# Patient Record
Sex: FEMALE | Race: WHITE | NOT HISPANIC OR LATINO | Employment: OTHER | URBAN - METROPOLITAN AREA
[De-identification: names, ages, dates, MRNs, and addresses within clinical notes are randomized per-mention and may not be internally consistent; named-entity substitution may affect disease eponyms.]

---

## 2019-09-09 ENCOUNTER — HISTORICAL (OUTPATIENT)
Dept: ADMINISTRATIVE | Facility: HOSPITAL | Age: 74
End: 2019-09-09

## 2021-12-21 ENCOUNTER — HISTORICAL (OUTPATIENT)
Dept: ADMINISTRATIVE | Facility: HOSPITAL | Age: 76
End: 2021-12-21

## 2021-12-25 LAB — FINAL CULTURE: NORMAL

## 2022-04-11 ENCOUNTER — HISTORICAL (OUTPATIENT)
Dept: ADMINISTRATIVE | Facility: HOSPITAL | Age: 77
End: 2022-04-11

## 2022-04-27 VITALS
DIASTOLIC BLOOD PRESSURE: 68 MMHG | WEIGHT: 115 LBS | SYSTOLIC BLOOD PRESSURE: 132 MMHG | BODY MASS INDEX: 21.16 KG/M2 | HEIGHT: 62 IN

## 2022-05-04 NOTE — HISTORICAL [facility]
This is a historical note converted from [employer]. Formatting and pictures may have been removed.  Please reference [employer] for original formatting and attached multimedia. Chief Complaint  Pt is here for left hand pain and bilateral heel pain. Pt also c/o right middle finger  locking  History of Present Illness  Patient is here for evaluation of?a mass that has gotten smaller?on the left index finger. ?It was bothering her?but now has?gotten smaller no longer hurts.? She thinks it is a cyst. ?She also has a triggering of her right middle finger and bilateral heel pain.? No history of injury.  Review of Systems  Systemic: No fever, no chills, and no recent weight change.  Head: No headache - frequent.  Eyes: No vision problems.  Otolarnygeal: No hearing loss, no earache, no epistaxis, no hoarseness, and no tooth pain. Gums normal.  Cardiovascular: No chest pain or discomfort and no palpitations.  Pulmonary: No pulmonary symptoms - difficulty sleeping, no dyspnea, and cough not worse in the morning.  Gastrointestinal: Appetite not decreased. No dysphagia and no constant eructation. No nausea, no vomiting, no abdominal pain, no hematochezia, and no loose/mushy stools - frequent. No constipation - frequent.  Genitourinary: No genitourinary symptoms - Getting up every night to urinate and no increase in urinary frequency. No urinary hesitancy. No urinary loss of control - difficulty stopping urination and no burning sensation during urination.  Musculoskeletal: No calf muscle cramps and no localized soft tissue swelling of the ankle.  Neurological: No fainting and no convulsions.  Psychological: Not feeling nervous tension, not feeling nervous from exhaustion, and no depression.  Skin: No rash. Previous history of no ulcers.  Physical Exam  Vitals & Measurements  HR:?78(Peripheral)? BP:?132/68?  HT:?158?cm? WT:?52.16?kg? BMI:?20.89?  Left hand exam:  Normal capillary refill  2+ pulses  Normal sensation  Normal motor  function  Full range of motion  No triggering  Patient has a palpable?likely?ganglion cyst?over the?volar aspect?of the?flexor?tendon?sheath.  Left hand radiographs are normal  ?  Right hand exam:  Normal capillary refill  2+ pulses next a normal sensation  normal motor function  Full range of motion  Triggering of the?right middle finger?at the A1 pulley  ???  ???  Using sterile techniques after informed verbal consent. Risk discussed with patient prior to injection  ?  Bilateral foot exam:  2+ pulses  Normal sensation  Normal motor function  Tight Achilles  Tenderness palpation?at the medial?plantar insertion?of the plantar fascia at the calcaneus  No tenderness over the posterior tibialis tendons  Supple normal range of motion of both?hindfeet/subtalar joints  No crepitus  Radiographs of both heels are normal  Assessment/Plan  1.?Ganglion of flexor tendon sheath of left index finger?M67.442  ?If the mass enlarges or becomes?tender then I would recommend excision of the ganglion?from the left index flexor tendon sheath overlying the proximal phalanx  Ordered:  Office/Outpatient Visit Level 2 New [id number] PC, Ganglion of flexor tendon sheath of left index finger  Trigger finger, right middle finger  Bilateral plantar fasciitis, [facility], 09/09/19 15:09:00 CDT  ?  2.?Trigger finger, right middle finger?M65.331  ?If no improvement in a few weeks?with the corticosteroid injection of the tendon sheath?then patient will call?if she wants to proceed with right middle finger trigger finger release  Ordered:  betamethasone, 6 mg, Intra-Articular, Once, first dose 09/09/19 16:00:00 CDT, stop date 09/09/19 16:00:00 CDT  Lidocaine inj., 1 mL, Intra-Articular, Once, first dose 09/09/19 15:09:00 CDT, stop date 09/09/19 15:09:00 CDT  Office/Outpatient Visit Level 2 New [id number] PC, Ganglion of flexor tendon sheath of left index finger  Trigger finger, right middle finger  Bilateral plantar fasciitis, [facility], 09/09/19 15:09:00 CDT  ?  3.?Bilateral plantar fasciitis?M72.2  ?Home exercise program for stretching  Physical therapy  Ordered:  Office/Outpatient Visit Level 2 New [id number] PC, Ganglion of flexor tendon sheath of left index finger  Trigger finger, right middle finger  Bilateral plantar fasciitis, [facility], 09/09/19 15:09:00 CDT  PT/OT External Referral, 09/09/19 15:10:00 CDT, Bilateral plantar fasciitis, Evaluate and Treat  Therapeutic Excercise  Stretch and Strengthen  Modalities, GENERAL, 3 X Week  ?  Orders:  Clinic Follow-up PRN, 09/09/19 15:09:00 CDT, [facility]lig [id number] PC, 09/09/19 15:09:00 CDT, [facility], Routine, 09/09/19 15:09:00 CDT  XR Hand Left Minimum 3 Views, Routine, 09/09/19 14:39:00 CDT, Pain, None, Ambulatory, Rad Type, Heel pain, bilateral  Left hand pain, 09/09/19 14:39:00 CDT  XR Heel Calcaneus Left 2 Views, Routine, 09/09/19 14:40:00 CDT, Pain, None, Ambulatory, Rad Type, Heel pain, bilateral  Left hand pain, 09/09/19 14:40:00 CDT  XR Heel Calcaneus Right 2 Views, Routine, 09/09/19 14:40:00 CDT, Pain, None, Ambulatory, Rad Type, Heel pain, bilateral  Left hand pain, 09/09/19 14:40:00 CDT  Referrals  Clinic Follow-up PRN, 09/09/19 15:09:00 CDT, Future Order, Orthopaedics  PT/OT External Referral, 09/09/19 15:10:00 CDT, Bilateral plantar fasciitis, Evaluate and Treat  Therapeutic Excercise  Stretch and Strengthen  Modalities, GENERAL, 3 X Week   Problem List/Past Medical History  Ongoing  Bilateral plantar fasciitis  Ganglion of flexor tendon sheath of left index finger  Trigger finger, right middle finger  Historical  No qualifying data  Medications  Celestone, 6 mg, Intra-Articular, Once  Flonase 50 mcg/inh nasal spray, 1 spray(s), Nasal, BID  KlonoPIN 0.5 mg oral tablet, 0.5 mg= 1 tab(s), Oral, Once a day (at bedtime)  Lexapro 5 mg oral tablet, 5 mg= 1 tab(s), Oral, Daily, 3 refills  lidocaine 2%  injectable solution, 1 mL, Intra-Articular, Once  Vitamin B Complex oral capsule, 1 cap(s), Oral, Daily  Vitamin D 1,000 Units Tab, 2000 units, Oral, Daily  Allergies  cortisone?(CHEST PAIN)  sulfa drugs?(Vomiting)  unknown antibiotic  Social History  Abuse/Neglect  No, 09/09/2019  Alcohol - Denies Alcohol Use, 09/04/2014  Never, 10/20/2017  Tobacco - Denies Tobacco Use, 09/04/2014  Never (less than 100 in lifetime), N/A, 09/09/2019  Health Maintenance  Health Maintenance  ???Pending?(in the next year)  ??? ??OverDue  ??? ? ? ?Advance Directive due??01/01/19??and every 1??year(s)  ??? ? ? ?Alcohol Misuse Screening due??01/01/19??and every 1??year(s)  ??? ? ? ?Cognitive Screening due??01/01/19??and every 1??year(s)  ??? ? ? ?Geriatric Depression Screening due??01/01/19??and every 1??year(s)  ??? ??Due?  ??? ? ? ?ADL Screening due??09/09/19??and every 1??year(s)  ??? ? ? ?Aspirin Therapy for CVD Prevention due??09/09/19??and every 1??year(s)  ??? ? ? ?Bone Density Screening due??09/09/19??Variable frequency  ??? ? ? ?Pneumococcal Vaccine due??09/09/19??Variable frequency  ??? ? ? ?Tetanus Vaccine due??09/09/19??and every 10??year(s)  ??? ? ? ?Zoster Vaccine due??09/09/19??and every 100??year(s)  ??? ??Due In Future?  ??? ? ? ?Fall Risk Assessment not due until??01/01/20??and every 1??year(s)  ??? ? ? ?Functional Assessment not due until??01/01/20??and every 1??year(s)  ??? ? ? ?Obesity Screening not due until??01/01/20??and every 1??year(s)  ???Satisfied?(in the past 1 year)  ??? ??Satisfied?  ??? ? ? ?Blood Pressure Screening on??09/09/19.??Satisfied by Sarahi Mann LPN  ??? ? ? ?Body Mass Index Check on??09/09/19.??Satisfied by Sarahi Mann LPN  ??? ? ? ?Fall Risk Assessment on??09/09/19.??Satisfied by Sarahi Mann LPN  ??? ? ? ?Functional Assessment on??09/09/19.??Satisfied by Sarahi Mann LPN  ??? ? ? ?Obesity Screening on??09/09/19.??Satisfied by Sarahi Mann LPN  ?      After verbal consent the right  middle finger was prepped in sterile fashion.? 1 cc of lidocaine and 1 cc of betamethasone was injected into the?A1 pulley on a 25-gauge needle. ?Patient tolerated the procedure well

## 2023-04-01 ENCOUNTER — OFFICE VISIT (OUTPATIENT)
Dept: URGENT CARE | Facility: CLINIC | Age: 78
End: 2023-04-01
Payer: MEDICARE

## 2023-04-01 VITALS
WEIGHT: 120 LBS | SYSTOLIC BLOOD PRESSURE: 125 MMHG | BODY MASS INDEX: 22.08 KG/M2 | RESPIRATION RATE: 18 BRPM | OXYGEN SATURATION: 98 % | DIASTOLIC BLOOD PRESSURE: 61 MMHG | TEMPERATURE: 99 F | HEIGHT: 62 IN | HEART RATE: 108 BPM

## 2023-04-01 DIAGNOSIS — H65.193 OTHER ACUTE NONSUPPURATIVE OTITIS MEDIA OF BOTH EARS, RECURRENCE NOT SPECIFIED: Primary | ICD-10-CM

## 2023-04-01 PROCEDURE — 99203 OFFICE O/P NEW LOW 30 MIN: CPT | Mod: ,,, | Performed by: PHYSICIAN ASSISTANT

## 2023-04-01 PROCEDURE — 99203 PR OFFICE/OUTPT VISIT, NEW, LEVL III, 30-44 MIN: ICD-10-PCS | Mod: ,,, | Performed by: PHYSICIAN ASSISTANT

## 2023-04-01 RX ORDER — AMOXICILLIN 875 MG/1
875 TABLET, FILM COATED ORAL 2 TIMES DAILY
Qty: 20 TABLET | Refills: 0 | Status: SHIPPED | OUTPATIENT
Start: 2023-04-01 | End: 2023-04-11

## 2023-04-01 RX ORDER — NEOMYCIN SULFATE, POLYMYXIN B SULFATE AND HYDROCORTISONE 10; 3.5; 1 MG/ML; MG/ML; [USP'U]/ML
4 SUSPENSION/ DROPS AURICULAR (OTIC) 4 TIMES DAILY
Qty: 10 ML | Refills: 0 | Status: SHIPPED | OUTPATIENT
Start: 2023-04-01 | End: 2023-04-08

## 2023-04-01 NOTE — PROGRESS NOTES
"Subjective:      Patient ID: Casandra Beard is a 77 y.o. female.    Vitals:  height is 5' 2" (1.575 m) and weight is 54.4 kg (120 lb). Her oral temperature is 99.3 °F (37.4 °C). Her blood pressure is 125/61 and her pulse is 108. Her respiration is 18 and oxygen saturation is 98%.     Chief Complaint: Otalgia (Bilateral ear pain, sore throat, congestion. Discolored tongue. Started about a week ago. )    HPI  patient reports over the past week having seasonal allergies now developing central congestion and bilateral alternating ear pressure right greater than left states having reduced nasal congestion but persistent ear pain presents to urgent Care for initial evaluation.      Additional comments: Bilateral ear pain, sore throat, congestion.   Discolored tongue. Started about a week ago.       Constitution: Negative for chills, fatigue and fever.   HENT:  Positive for ear pain, congestion and sore throat. Negative for sinus pain, sinus pressure, trouble swallowing and voice change.    Neck: Negative for neck pain and neck swelling.   Cardiovascular:  Negative for chest pain.   Respiratory:  Negative for cough, shortness of breath, stridor and wheezing.    Gastrointestinal: Negative.    Musculoskeletal:  Negative for pain, joint pain, back pain and muscle ache.   Skin: Negative.  Negative for erythema.   Allergic/Immunologic: Negative.    Neurological:  Negative for headaches and altered mental status.   Psychiatric/Behavioral:  Negative for altered mental status.     Objective:     Physical Exam   Constitutional: She is oriented to person, place, and time. She appears well-developed. She is cooperative.  Non-toxic appearance. She does not appear ill. No distress.      Comments:Awake alert pleasant ambulatory female talking to daughter     HENT:   Head: Normocephalic.   Ears:   Right Ear: External ear and ear canal normal. No no drainage or tenderness. No mastoid tenderness. Tympanic membrane is erythematous and " bulging. Tympanic membrane is not perforated. Decreased hearing is noted.   Left Ear: Hearing, external ear and ear canal normal. No no drainage or tenderness. No mastoid tenderness. Tympanic membrane is erythematous. Tympanic membrane is not perforated and not bulging.   Nose: Congestion present. No mucosal edema, rhinorrhea or nasal deformity. No epistaxis. Right sinus exhibits no maxillary sinus tenderness and no frontal sinus tenderness. Left sinus exhibits no maxillary sinus tenderness and no frontal sinus tenderness.      Comments: Mild  Mouth/Throat: Uvula is midline, oropharynx is clear and moist and mucous membranes are normal. Mucous membranes are moist. No trismus in the jaw. Normal dentition. No uvula swelling. No oropharyngeal exudate, posterior oropharyngeal edema or posterior oropharyngeal erythema.      Comments: No edema no palate petechiae no trismus no drooling  Eyes: Conjunctivae and lids are normal. No scleral icterus.   Neck: Trachea normal and phonation normal. Neck supple. No edema present. No erythema present. No neck rigidity present.   Cardiovascular: Normal rate, regular rhythm, normal heart sounds and normal pulses.   Pulmonary/Chest: Effort normal and breath sounds normal. No respiratory distress. She has no decreased breath sounds. She has no wheezes. She has no rhonchi. She has no rales.   Abdominal: Normal appearance.   Musculoskeletal: Normal range of motion.         General: Normal range of motion.      Cervical back: She exhibits no tenderness.   Lymphadenopathy:     She has cervical adenopathy.   Neurological: no focal deficit. She is alert and oriented to person, place, and time. No cranial nerve deficit. She exhibits normal muscle tone.   Skin: Skin is warm, dry, intact, not diaphoretic, not pale and no rash. No erythema   Psychiatric: Her speech is normal and behavior is normal. Mood, judgment and thought content normal.   Nursing note and vitals reviewed.       Previous  "History      Review of patient's allergies indicates:  No Known Allergies    Past Medical History:   Diagnosis Date    GERD (gastroesophageal reflux disease)     Hyperlipidemia      Current Outpatient Medications   Medication Instructions    amoxicillin (AMOXIL) 875 mg, Oral, 2 times daily    neomycin-polymyxin-hydrocortisone (CORTISPORIN) 3.5-10,000-1 mg/mL-unit/mL-% otic suspension 4 drops, Both Ears, 4 times daily     Past Surgical History:   Procedure Laterality Date    CLAVICLE SURGERY       History reviewed. No pertinent family history.    Social History     Tobacco Use    Smoking status: Never    Smokeless tobacco: Never        Physical Exam      Vital Signs Reviewed   /61   Pulse 108   Temp 99.3 °F (37.4 °C) (Oral)   Resp 18   Ht 5' 2" (1.575 m)   Wt 54.4 kg (120 lb)   SpO2 98%   BMI 21.95 kg/m²        Procedures    Procedures     Labs     Results for orders placed or performed in visit on 12/21/21   Urine culture    Specimen: Urine   Result Value Ref Range    FINAL CULTURE No significant growth.        Assessment:     1. Other acute nonsuppurative otitis media of both ears, recurrence not specified        Plan:   Recommend amoxicillin antibiotic coverage for bilateral ear infection concern.  Recommend alternate Tylenol and ibuprofen every 4-6 hours as needed for mild-to-moderate pain and inflammation fever or chills.  Recommend alternating decongestant antihistamine nasal spray daily as needed for upper respiratory symptoms.  May apply Cortisporin drops to ear canal for temporary relief.  Recommend follow-up with primary care physician in the next week for ear infection re-evaluation and continued care planning.    Other acute nonsuppurative otitis media of both ears, recurrence not specified    Other orders  -     amoxicillin (AMOXIL) 875 MG tablet; Take 1 tablet (875 mg total) by mouth 2 (two) times daily. for 10 days  Dispense: 20 tablet; Refill: 0  -     neomycin-polymyxin-hydrocortisone " (CORTISPORIN) 3.5-10,000-1 mg/mL-unit/mL-% otic suspension; Place 4 drops into both ears 4 (four) times daily. for 7 days  Dispense: 10 mL; Refill: 0

## 2023-04-01 NOTE — PATIENT INSTRUCTIONS
Recommend amoxicillin antibiotic coverage for bilateral ear infection concern.  Recommend alternate Tylenol and ibuprofen every 4-6 hours as needed for mild-to-moderate pain and inflammation fever or chills.  Recommend alternating decongestant antihistamine nasal spray daily as needed for upper respiratory symptoms.  May apply Cortisporin drops to ear canal for temporary relief.  Recommend follow-up with primary care physician in the next week for ear infection re-evaluation and continued care planning.

## 2023-04-03 ENCOUNTER — TELEPHONE (OUTPATIENT)
Dept: URGENT CARE | Facility: CLINIC | Age: 78
End: 2023-04-03
Payer: MEDICARE

## 2023-04-03 RX ORDER — DOXYCYCLINE 100 MG/1
100 CAPSULE ORAL EVERY 12 HOURS
Qty: 14 CAPSULE | Refills: 0 | Status: CANCELLED | OUTPATIENT
Start: 2023-04-03 | End: 2023-04-10

## 2023-04-03 RX ORDER — DOXYCYCLINE 100 MG/1
100 CAPSULE ORAL EVERY 12 HOURS
Qty: 14 CAPSULE | Refills: 0 | Status: SHIPPED | OUTPATIENT
Start: 2023-04-03 | End: 2023-04-03

## 2023-04-03 RX ORDER — DOXYCYCLINE 100 MG/1
100 CAPSULE ORAL EVERY 12 HOURS
Qty: 14 CAPSULE | Refills: 0 | Status: SHIPPED | OUTPATIENT
Start: 2023-04-03 | End: 2023-04-10

## 2023-04-03 RX ORDER — FLUCONAZOLE 150 MG/1
150 TABLET ORAL DAILY
Qty: 1 TABLET | Refills: 1 | Status: SHIPPED | OUTPATIENT
Start: 2023-04-03 | End: 2023-04-04

## 2023-04-03 NOTE — TELEPHONE ENCOUNTER
Pt states that she was seen on 04/01/2023. She states that she is not feeling better. States that she is having BM's every hour. Now she is vomiting. She was prescribed amoxicillin and at the time of her visit she could not remember if she had a penicillin allergy.     Doxycycline was taken in the past without these symptoms.

## 2023-04-03 NOTE — TELEPHONE ENCOUNTER
Called pt, made aware of RX being sent to pharmacy. Voiced understanding. Made her aware to also stop taking the amoxicillin.